# Patient Record
Sex: FEMALE | Race: WHITE | NOT HISPANIC OR LATINO | Employment: FULL TIME | URBAN - METROPOLITAN AREA
[De-identification: names, ages, dates, MRNs, and addresses within clinical notes are randomized per-mention and may not be internally consistent; named-entity substitution may affect disease eponyms.]

---

## 2022-03-08 PROBLEM — F41.1 GAD (GENERALIZED ANXIETY DISORDER): Status: ACTIVE | Noted: 2022-03-08

## 2022-03-08 PROBLEM — F32.9 MAJOR DEPRESSIVE DISORDER: Status: ACTIVE | Noted: 2022-03-08

## 2024-05-02 ENCOUNTER — OFFICE VISIT (OUTPATIENT)
Dept: MEDICAL GROUP | Facility: MEDICAL CENTER | Age: 54
End: 2024-05-02
Payer: COMMERCIAL

## 2024-05-02 VITALS
HEART RATE: 56 BPM | WEIGHT: 181 LBS | SYSTOLIC BLOOD PRESSURE: 126 MMHG | HEIGHT: 70 IN | DIASTOLIC BLOOD PRESSURE: 72 MMHG | OXYGEN SATURATION: 100 % | BODY MASS INDEX: 25.91 KG/M2 | TEMPERATURE: 98.1 F

## 2024-05-02 DIAGNOSIS — Z12.31 ENCOUNTER FOR SCREENING MAMMOGRAM FOR MALIGNANT NEOPLASM OF BREAST: ICD-10-CM

## 2024-05-02 DIAGNOSIS — Z00.00 ENCOUNTER FOR PREVENTATIVE ADULT HEALTH CARE EXAMINATION: Primary | ICD-10-CM

## 2024-05-02 DIAGNOSIS — F41.1 GAD (GENERALIZED ANXIETY DISORDER): ICD-10-CM

## 2024-05-02 DIAGNOSIS — R92.333 HETEROGENEOUSLY DENSE TISSUE OF BOTH BREASTS ON MAMMOGRAPHY: ICD-10-CM

## 2024-05-02 DIAGNOSIS — R63.5 WEIGHT GAIN: ICD-10-CM

## 2024-05-02 PROCEDURE — 99396 PREV VISIT EST AGE 40-64: CPT | Performed by: FAMILY MEDICINE

## 2024-05-02 PROCEDURE — 3074F SYST BP LT 130 MM HG: CPT | Performed by: FAMILY MEDICINE

## 2024-05-02 PROCEDURE — 99214 OFFICE O/P EST MOD 30 MIN: CPT | Mod: 25 | Performed by: FAMILY MEDICINE

## 2024-05-02 PROCEDURE — 3078F DIAST BP <80 MM HG: CPT | Performed by: FAMILY MEDICINE

## 2024-05-02 RX ORDER — ESCITALOPRAM OXALATE 5 MG/1
TABLET ORAL
Qty: 42 TABLET | Refills: 0 | Status: SHIPPED | OUTPATIENT
Start: 2024-05-02 | End: 2024-05-30

## 2024-05-02 ASSESSMENT — FIBROSIS 4 INDEX: FIB4 SCORE: 0.74

## 2024-05-02 NOTE — PROGRESS NOTES
Subjective:     CC:   Chief Complaint   Patient presents with    Annual Exam    Weight Check       HPI:   Nikki Jones is a 53 y.o. female who presents for annual exam    History of Present Illness  The patient is a 53-year-old female who is here for her annual preventative visit and she also has concerns about weight.    The patient typically undergoes breast ultrasound due to a cyst in her left breast. She does not currently have a gynecologist and has no history of pregnancies. Her last Pap smear was conducted in 2023, with no history of abnormal results. Despite experiencing hot flashes, she is not on hormone replacement therapy. She denies any urinary incontinence and maintains a regular diet. She engages in regular exercise and denies any substance abuse. She is currently in a safe relationship and practices safety measures such as wearing a seatbelt in the car, applying sunscreen when outdoors, and brushing her teeth daily.    The patient reports a gradual weight gain of 8 to 10 pounds since the holiday season. She acknowledges excessive consumption of red wine on weekends and occasionally during the week, which she believes has slowed her metabolism. She also consumes sweets, which she attributes to her diet and lack of gym attendance. She speculates that her cortisol levels may be contributing to her weight gain.    The patient is currently under the care of a counselor through [facility], who has recommended medication for her weight. She has previously taken Paxil and has researched Lexapro. She has researched positive reactions to Lexapro online and has expressed concerns about potential side effects.    Cancer screening  Colorectal Cancer Screenin2022 due   Lung Cancer Screening: Non-smoker  Breast Cancer Screenin2023  Hep C screening: Due    Ob-Gyn/ History:    Patient has GYN provider: no  /Para:  G0  Last Pap Smear:  2022.  No history of abnormal pap smears.  Gyn  Surgery: None.  No LMP recorded.  She has not utilized hormone replacement therapy.  Reports hot flashes and night sweats  No significant bloating/fluid retention, pelvic pain, or dyspareunia. No abnormal vaginal discharge.   No breast tenderness, mass, nipple discharge or changes in size or contour.  Urinary incontinence: None    Health Maintenance  Advanced directive: N/A  Osteoporosis Screen/ DEXA: Due at 66 years old  PT/vit D for falls prevention: None   Cholesterol Screenin2024 non-HDL cholesterol 106  Diabetes Screenin2024 glucose 96  Aspirin Use: Not indicated     Diet: Regular   Exercise: Trying to go to the gym, but not often     Substance Abuse: No concerns  Seat belts, bike helmet, gun safety discussed.  Sun protection used.  Routine Dental: Daily brushing and follows with a dentist     Immunizations  Influenza: Out of season   HPV series: Aged out   Tetanus: 2022   Shingles: Due   COVID: Up-to-date  Pneumococcal : Completed 2022  Hep B: Completed series in   Other immunizations: None    OB History   No obstetric history on file.      She  reports that she is not currently sexually active.    She  has no past medical history on file.  She  has a past surgical history that includes other orthopedic surgery ().    Family History   Problem Relation Age of Onset    Heart Disease Mother         MI,  at 62    Lung Disease Mother         + smk    Cancer Father         prostate    No Known Problems Sister     No Known Problems Brother     Stroke Maternal Grandfather         possible     Social History     Tobacco Use    Smoking status: Some Days     Types: Cigars    Smokeless tobacco: Never    Tobacco comments:     only on the weekends   Vaping Use    Vaping Use: Never used   Substance Use Topics    Alcohol use: Yes     Alcohol/week: 6.0 oz     Types: 10 Glasses of wine per week     Comment: 2 glass every night and 2 on the weekend    Drug use: Yes     Types: Marijuana     Comment:  "on the weekends       Patient Active Problem List    Diagnosis Date Noted    Skin tag 05/02/2023    Hot flashes 05/02/2023    Anxiety 05/02/2023    Vitamin D insufficiency 04/14/2022    Encounter for preventative adult health care examination 04/14/2022    Major depressive disorder 03/08/2022    Brain fog 03/08/2022    Tinnitus of both ears 03/08/2022    KARLENE (generalized anxiety disorder) 03/08/2022     Current Outpatient Medications   Medication Sig Dispense Refill    escitalopram (LEXAPRO) 5 MG tablet Take 1 Tablet by mouth every day for 14 days, THEN 2 Tablets every day for 14 days. 42 Tablet 0    Multiple Vitamin (MULTI VITAMIN DAILY PO) Take  by mouth.      B Complex Vitamins (VITAMIN-B COMPLEX PO) Take  by mouth.       No current facility-administered medications for this visit.     No Known Allergies      Objective:   /72   Pulse (!) 56   Temp 36.7 °C (98.1 °F)   Ht 1.778 m (5' 10\")   Wt 82.1 kg (181 lb)   SpO2 100%   BMI 25.97 kg/m²     Wt Readings from Last 4 Encounters:   05/02/24 82.1 kg (181 lb)   05/02/23 70 kg (154 lb 5.2 oz)   04/14/22 75.2 kg (165 lb 12.6 oz)   03/08/22 75.7 kg (166 lb 14.2 oz)       Physical Exam  Constitutional:       General: She is not in acute distress.  HENT:      Head: Normocephalic and atraumatic.      Right Ear: Tympanic membrane and external ear normal.      Left Ear: Tympanic membrane and external ear normal.      Nose: No nasal deformity.      Mouth/Throat:      Lips: Pink.      Mouth: Mucous membranes are moist.      Pharynx: Oropharynx is clear. Uvula midline. No posterior oropharyngeal erythema.   Eyes:      General: Lids are normal.      Extraocular Movements: Extraocular movements intact.      Conjunctiva/sclera: Conjunctivae normal.      Pupils: Pupils are equal, round, and reactive to light.   Neck:      Trachea: Trachea normal.   Cardiovascular:      Rate and Rhythm: Normal rate and regular rhythm.      Heart sounds: Normal heart sounds. No murmur " heard.     No friction rub. No gallop.   Pulmonary:      Effort: Pulmonary effort is normal. No accessory muscle usage.      Breath sounds: Normal breath sounds. No wheezing or rales.   Abdominal:      General: Bowel sounds are normal.      Palpations: Abdomen is soft.      Tenderness: There is no abdominal tenderness.   Musculoskeletal:      Cervical back: Normal range of motion and neck supple.      Right lower leg: No edema.      Left lower leg: No edema.   Lymphadenopathy:      Cervical: No cervical adenopathy.   Skin:     General: Skin is warm and dry.      Findings: No rash.   Neurological:      General: No focal deficit present.      Mental Status: She is alert and oriented to person, place, and time. Mental status is at baseline.      GCS: GCS eye subscore is 4. GCS verbal subscore is 5. GCS motor subscore is 6.      Motor: No weakness.      Gait: Gait is intact.   Psychiatric:         Attention and Perception: Attention normal.         Mood and Affect: Mood and affect normal.         Speech: Speech normal.         Results  Laboratory Studies  Total cholesterol is within range. Triglycerides look good. HDL is 68. LDL is great at 91. Blood sugar is 96. BUN and creatinine, GFR, good kidney function, no sign of kidney disease. Basic electrolytes look good. Calcium levels are good. Liver function and liver enzymes are good. Blood counts are perfect. White blood cells, red blood cells, hemoglobin, hematocrit, no anemia. The shape and color of red blood cells are normal. Platelets look good.      Assessment and Plan:        Assessment & Plan  1. Annual preventative visit.  The patient's last colon cancer screening was conducted in 2023, with a subsequent due in 2027. She is due for a breast cancer screening this year, which revealed the presence of dense breast tissues. Her last Pap smear was conducted in 04/2023, with no history of abnormal results. She continues to experience hot flashes, which are gradually  subsiding. A mammogram and ultrasound have been ordered. The patient has been advised to verify her insurance coverage prior to scheduling the ultrasound. If the insurance does not cover the cost, it is postponed until the coding is fixed. The shingles vaccine has been recommended.    Anticipatory guidance:  --Discussed moderation in sodium/caffeine intake, saturated fat and cholesterol, caloric balance, sufficient fresh fruits/vegetables, fiber, iron, and 0.4-0.8mg of folate supplement per day (for females capable of pregnancy).  --Discussed brushing, flossing, and dental visits.   --Encouraged regular exercise, 150 mins a week of moderate to high intensity cardiovascular activity.   --Discussed tobacco, alcohol, or other drug use; availability of treatment for abuse.   --Discussed sexually transmitted infections, partner selection, use of condoms, avoidance of unintended pregnancy and contraceptive alternatives.  --Injury prevention: Discussed safety belts, safety helmets, smoke detector, etc.  - Discussed vaccinations but they are due for  -Discussed  breast self exam, mammography screening, diet and exercise, colorectal cancer screening     Health maintenance: Due for shingles vaccine, hepatitis C and hep C screen  Immunizations per orders  Patient counseled about skin care, diet, supplements, and exercise.  Labs per orders    2. Weight gain.  Chronic, unstable  A.m. cortisol level test has been ordered, to be conducted 2 to 3 hours post-wakening. Thyroid function tests have been ordered. The patient has been advised to reduce her red wine intake to 200 to 300 calories per glass. Intermittent fasting has been recommended, with a 14-hour fast followed by a 16-hour fast. Cardio workouts and weightlifting have been recommended. Protein intake should be at least 1 g per meal. Plant-based proteins such as legumes, beans, lentils, chickpeas, and peanut butter are also suggested. Red meat should be avoided.    3.  Anxiety.  Chronic, unstable  A prescription for Lexapro 5 mg has been issued for a period of 2 weeks. The patient has been advised to take the medication in the morning. If the patient does not experience any adverse effects after 2 weeks, the dosage may be increased to 10 mg or 2 tablets.    Follow-up  The patient is scheduled for a follow-up visit in 4 weeks.  Nikki was seen today for annual exam and weight check.    Diagnoses and all orders for this visit:    Encounter for preventative adult health care examination    Encounter for screening mammogram for malignant neoplasm of breast  -     MA-SCREENING MAMMO BILAT W/TOMOSYNTHESIS W/CAD; Future  -     US-SCREENING WHOLE BREAST BILATERAL (3D SCREENING); Future    Heterogeneously dense tissue of both breasts on mammography  -     US-SCREENING WHOLE BREAST BILATERAL (3D SCREENING); Future    Weight gain  -     TSH; Future  -     FREE THYROXINE; Future  -     CORTISOL - AM    KARLENE (generalized anxiety disorder)  -     escitalopram (LEXAPRO) 5 MG tablet; Take 1 Tablet by mouth every day for 14 days, THEN 2 Tablets every day for 14 days.         Follow-up: Return in about 4 weeks (around 5/30/2024) for Medication follow-up.       Educated on annual wellness visit information that is covered by their insurance during these exams.  Patient informed that they may receive an additional charge for any acute complaints that are brought up during the annual wellness visit.  Patient acknowledges advisement.    Please note that this dictation was created using voice recognition software. I have made every reasonable attempt to correct obvious errors, but I expect that there are errors of grammar and possibly content that I did not discover before finalizing the note.

## 2024-07-11 ENCOUNTER — HOSPITAL ENCOUNTER (OUTPATIENT)
Dept: RADIOLOGY | Facility: MEDICAL CENTER | Age: 54
End: 2024-07-11
Attending: FAMILY MEDICINE
Payer: COMMERCIAL

## 2024-07-11 DIAGNOSIS — Z12.31 ENCOUNTER FOR SCREENING MAMMOGRAM FOR MALIGNANT NEOPLASM OF BREAST: ICD-10-CM

## 2024-07-11 DIAGNOSIS — R92.333 HETEROGENEOUSLY DENSE TISSUE OF BOTH BREASTS ON MAMMOGRAPHY: ICD-10-CM

## 2024-07-11 PROCEDURE — 77063 BREAST TOMOSYNTHESIS BI: CPT

## 2024-07-11 PROCEDURE — 76641 ULTRASOUND BREAST COMPLETE: CPT

## 2024-07-12 DIAGNOSIS — R92.8 ABNORMAL MAMMOGRAM OF RIGHT BREAST: ICD-10-CM

## 2024-07-12 DIAGNOSIS — N64.89 BREAST ASYMMETRY: ICD-10-CM

## 2024-07-18 ENCOUNTER — HOSPITAL ENCOUNTER (OUTPATIENT)
Dept: RADIOLOGY | Facility: MEDICAL CENTER | Age: 54
End: 2024-07-18
Attending: FAMILY MEDICINE
Payer: COMMERCIAL

## 2024-07-18 DIAGNOSIS — N64.89 BREAST ASYMMETRY: ICD-10-CM

## 2024-07-18 DIAGNOSIS — R92.8 ABNORMAL MAMMOGRAM OF RIGHT BREAST: ICD-10-CM

## 2024-07-18 PROCEDURE — G0279 TOMOSYNTHESIS, MAMMO: HCPCS

## 2024-07-18 PROCEDURE — 76642 ULTRASOUND BREAST LIMITED: CPT | Mod: RT

## 2024-08-22 LAB
T4 FREE SERPL-MCNC: 1.24 NG/DL (ref 0.82–1.77)
TSH SERPL DL<=0.005 MIU/L-ACNC: 1.34 UIU/ML (ref 0.45–4.5)

## 2025-04-14 SDOH — HEALTH STABILITY: PHYSICAL HEALTH: ON AVERAGE, HOW MANY MINUTES DO YOU ENGAGE IN EXERCISE AT THIS LEVEL?: 0 MIN

## 2025-04-14 SDOH — ECONOMIC STABILITY: INCOME INSECURITY: HOW HARD IS IT FOR YOU TO PAY FOR THE VERY BASICS LIKE FOOD, HOUSING, MEDICAL CARE, AND HEATING?: NOT VERY HARD

## 2025-04-14 SDOH — HEALTH STABILITY: PHYSICAL HEALTH: ON AVERAGE, HOW MANY DAYS PER WEEK DO YOU ENGAGE IN MODERATE TO STRENUOUS EXERCISE (LIKE A BRISK WALK)?: 0 DAYS

## 2025-04-14 SDOH — ECONOMIC STABILITY: FOOD INSECURITY: WITHIN THE PAST 12 MONTHS, YOU WORRIED THAT YOUR FOOD WOULD RUN OUT BEFORE YOU GOT MONEY TO BUY MORE.: NEVER TRUE

## 2025-04-14 SDOH — ECONOMIC STABILITY: FOOD INSECURITY: WITHIN THE PAST 12 MONTHS, THE FOOD YOU BOUGHT JUST DIDN'T LAST AND YOU DIDN'T HAVE MONEY TO GET MORE.: NEVER TRUE

## 2025-04-14 SDOH — HEALTH STABILITY: MENTAL HEALTH
STRESS IS WHEN SOMEONE FEELS TENSE, NERVOUS, ANXIOUS, OR CAN'T SLEEP AT NIGHT BECAUSE THEIR MIND IS TROUBLED. HOW STRESSED ARE YOU?: VERY MUCH

## 2025-04-14 SDOH — ECONOMIC STABILITY: INCOME INSECURITY: IN THE LAST 12 MONTHS, WAS THERE A TIME WHEN YOU WERE NOT ABLE TO PAY THE MORTGAGE OR RENT ON TIME?: NO

## 2025-04-14 SDOH — ECONOMIC STABILITY: TRANSPORTATION INSECURITY
IN THE PAST 12 MONTHS, HAS LACK OF RELIABLE TRANSPORTATION KEPT YOU FROM MEDICAL APPOINTMENTS, MEETINGS, WORK OR FROM GETTING THINGS NEEDED FOR DAILY LIVING?: NO

## 2025-04-14 SDOH — ECONOMIC STABILITY: TRANSPORTATION INSECURITY
IN THE PAST 12 MONTHS, HAS THE LACK OF TRANSPORTATION KEPT YOU FROM MEDICAL APPOINTMENTS OR FROM GETTING MEDICATIONS?: NO

## 2025-04-14 SDOH — ECONOMIC STABILITY: TRANSPORTATION INSECURITY
IN THE PAST 12 MONTHS, HAS LACK OF TRANSPORTATION KEPT YOU FROM MEETINGS, WORK, OR FROM GETTING THINGS NEEDED FOR DAILY LIVING?: NO

## 2025-04-14 SDOH — ECONOMIC STABILITY: HOUSING INSECURITY
IN THE LAST 12 MONTHS, WAS THERE A TIME WHEN YOU DID NOT HAVE A STEADY PLACE TO SLEEP OR SLEPT IN A SHELTER (INCLUDING NOW)?: NO

## 2025-04-14 ASSESSMENT — SOCIAL DETERMINANTS OF HEALTH (SDOH)
HOW OFTEN DO YOU GET TOGETHER WITH FRIENDS OR RELATIVES?: ONCE A WEEK
DO YOU BELONG TO ANY CLUBS OR ORGANIZATIONS SUCH AS CHURCH GROUPS UNIONS, FRATERNAL OR ATHLETIC GROUPS, OR SCHOOL GROUPS?: NO
DO YOU BELONG TO ANY CLUBS OR ORGANIZATIONS SUCH AS CHURCH GROUPS UNIONS, FRATERNAL OR ATHLETIC GROUPS, OR SCHOOL GROUPS?: NO
HOW OFTEN DO YOU ATTENT MEETINGS OF THE CLUB OR ORGANIZATION YOU BELONG TO?: NEVER
HOW MANY DRINKS CONTAINING ALCOHOL DO YOU HAVE ON A TYPICAL DAY WHEN YOU ARE DRINKING: 1 OR 2
HOW OFTEN DO YOU ATTENT MEETINGS OF THE CLUB OR ORGANIZATION YOU BELONG TO?: NEVER
WITHIN THE PAST 12 MONTHS, YOU WORRIED THAT YOUR FOOD WOULD RUN OUT BEFORE YOU GOT THE MONEY TO BUY MORE: NEVER TRUE
HOW OFTEN DO YOU ATTEND CHURCH OR RELIGIOUS SERVICES?: NEVER
HOW OFTEN DO YOU GET TOGETHER WITH FRIENDS OR RELATIVES?: ONCE A WEEK
HOW OFTEN DO YOU HAVE SIX OR MORE DRINKS ON ONE OCCASION: NEVER
IN A TYPICAL WEEK, HOW MANY TIMES DO YOU TALK ON THE PHONE WITH FAMILY, FRIENDS, OR NEIGHBORS?: ONCE A WEEK
HOW OFTEN DO YOU HAVE A DRINK CONTAINING ALCOHOL: 2-3 TIMES A WEEK
HOW HARD IS IT FOR YOU TO PAY FOR THE VERY BASICS LIKE FOOD, HOUSING, MEDICAL CARE, AND HEATING?: NOT VERY HARD
IN THE PAST 12 MONTHS, HAS THE ELECTRIC, GAS, OIL, OR WATER COMPANY THREATENED TO SHUT OFF SERVICE IN YOUR HOME?: NO
IN A TYPICAL WEEK, HOW MANY TIMES DO YOU TALK ON THE PHONE WITH FAMILY, FRIENDS, OR NEIGHBORS?: ONCE A WEEK
HOW OFTEN DO YOU ATTEND CHURCH OR RELIGIOUS SERVICES?: NEVER

## 2025-04-14 ASSESSMENT — LIFESTYLE VARIABLES
SKIP TO QUESTIONS 9-10: 1
HOW OFTEN DO YOU HAVE A DRINK CONTAINING ALCOHOL: 2-3 TIMES A WEEK
AUDIT-C TOTAL SCORE: 3
HOW OFTEN DO YOU HAVE SIX OR MORE DRINKS ON ONE OCCASION: NEVER
HOW MANY STANDARD DRINKS CONTAINING ALCOHOL DO YOU HAVE ON A TYPICAL DAY: 1 OR 2

## 2025-04-15 ENCOUNTER — APPOINTMENT (OUTPATIENT)
Dept: MEDICAL GROUP | Facility: MEDICAL CENTER | Age: 55
End: 2025-04-15
Payer: COMMERCIAL

## 2025-04-15 ENCOUNTER — HOSPITAL ENCOUNTER (OUTPATIENT)
Facility: MEDICAL CENTER | Age: 55
End: 2025-04-15
Attending: FAMILY MEDICINE
Payer: COMMERCIAL

## 2025-04-15 VITALS
DIASTOLIC BLOOD PRESSURE: 72 MMHG | SYSTOLIC BLOOD PRESSURE: 122 MMHG | WEIGHT: 194 LBS | BODY MASS INDEX: 28.73 KG/M2 | HEART RATE: 87 BPM | OXYGEN SATURATION: 98 % | HEIGHT: 69 IN | TEMPERATURE: 98.3 F

## 2025-04-15 DIAGNOSIS — Z13.220 ENCOUNTER FOR LIPID SCREENING FOR CARDIOVASCULAR DISEASE: ICD-10-CM

## 2025-04-15 DIAGNOSIS — E55.9 VITAMIN D INSUFFICIENCY: ICD-10-CM

## 2025-04-15 DIAGNOSIS — Z01.419 ENCOUNTER FOR WELL WOMAN EXAM WITH ROUTINE GYNECOLOGICAL EXAM: Primary | ICD-10-CM

## 2025-04-15 DIAGNOSIS — Z01.419 ENCOUNTER FOR WELL WOMAN EXAM WITH ROUTINE GYNECOLOGICAL EXAM: ICD-10-CM

## 2025-04-15 DIAGNOSIS — R92.333 HETEROGENEOUSLY DENSE TISSUE OF BOTH BREASTS ON MAMMOGRAPHY: ICD-10-CM

## 2025-04-15 DIAGNOSIS — Z11.4 ENCOUNTER FOR SCREENING FOR HIV: ICD-10-CM

## 2025-04-15 DIAGNOSIS — Z12.31 ENCOUNTER FOR SCREENING MAMMOGRAM FOR MALIGNANT NEOPLASM OF BREAST: ICD-10-CM

## 2025-04-15 DIAGNOSIS — Z13.1 SCREENING FOR DIABETES MELLITUS: ICD-10-CM

## 2025-04-15 DIAGNOSIS — Z13.6 ENCOUNTER FOR LIPID SCREENING FOR CARDIOVASCULAR DISEASE: ICD-10-CM

## 2025-04-15 DIAGNOSIS — Z13.29 THYROID DISORDER SCREENING: ICD-10-CM

## 2025-04-15 DIAGNOSIS — Z11.59 ENCOUNTER FOR HEPATITIS C SCREENING TEST FOR LOW RISK PATIENT: ICD-10-CM

## 2025-04-15 DIAGNOSIS — Z13.0 SCREENING FOR DEFICIENCY ANEMIA: ICD-10-CM

## 2025-04-15 PROCEDURE — 99213 OFFICE O/P EST LOW 20 MIN: CPT | Mod: 25 | Performed by: FAMILY MEDICINE

## 2025-04-15 PROCEDURE — 99396 PREV VISIT EST AGE 40-64: CPT | Performed by: FAMILY MEDICINE

## 2025-04-15 PROCEDURE — 3074F SYST BP LT 130 MM HG: CPT | Performed by: FAMILY MEDICINE

## 2025-04-15 PROCEDURE — 87624 HPV HI-RISK TYP POOLED RSLT: CPT

## 2025-04-15 PROCEDURE — 3078F DIAST BP <80 MM HG: CPT | Performed by: FAMILY MEDICINE

## 2025-04-15 PROCEDURE — 88142 CYTOPATH C/V THIN LAYER: CPT

## 2025-04-15 ASSESSMENT — FIBROSIS 4 INDEX: FIB4 SCORE: 0.75

## 2025-04-15 NOTE — PROGRESS NOTES
Verbal consent was acquired by the patient to use [employer] ambient listening note generation during this visit Yes    Subjective:     CC:   Chief Complaint   Patient presents with    Annual Exam    Weight Loss    Gynecologic Exam    Ear Laceration     Bump in left ear        HPI:   Nikki Rahman is a 54 y.o. female who presents for annual exam    History of Present Illness  The patient is a 54-year-old individual presenting for their annual exam.    Gynecological History  - No history of pregnancies or gynecological surgeries  - Cervical dysplasia diagnosed at age 16, treated with cryotherapy, no subsequent abnormalities  - No hormone replacement therapy  - Reports hot flashes and night sweats, attributed to red wine consumption  - Denies urinary incontinence, pelvic pain, bloating, or abnormal vaginal discharge    General Health  - Maintains oral hygiene  - No recent illnesses or allergies  - Reports a painful breast cyst  - Takes a daily multivitamin, no dietary restrictions, eats out weekly  - Admits lack of physical exercise  - No substance abuse, currently not in a relationship  - Consistent seatbelt use, irregular sunscreen application    Weight Management  - Weight gain attributed to sedentary lifestyle, alcohol consumption, and menopause  - Acknowledges need to reduce alcohol intake and increase physical activity    Supplemental information: Cryotherapy for cervical dysplasia at age 16    SOCIAL HISTORY  Occasional weekend cigar smoking. Drinks alcohol.    Cancer screening  Colorectal Cancer Screening: Due 2027  Lung Cancer Screening: Cigars  Breast Cancer Screening: Due 2025  Hep C screening: Due    Ob-Gyn/ History:    Patient has GYN provider: no  /Para: G0  Last Pap Smear: Due. Positive history of abnormal pap smears, but normal since  Gyn Surgery:  None.  Menopausal  She has not utilized hormone replacement therapy.  Reports hot flashes, night sweats, sleep disruption, and mood  changes  No significant bloating/fluid retention, pelvic pain, or dyspareunia. No abnormal vaginal discharge.   No breast tenderness, mass, nipple discharge or changes in size or contour.  Urinary incontinence: None    Health Maintenance  Advanced directive: Due at 66 years old  Osteoporosis Screen/ DEXA: Due at 61 years old  PT/vit D for falls prevention: Daily multi   Cholesterol Screenin2024 non-HDL cholesterol 106   Diabetes Screenin2024 Glucose 96   Aspirin Use: Not indicated The 10-year ASCVD risk score (Ryan VALADEZ, et al., 2019) is: 2.6%      Diet: Regular, eating out once a week   Exercise: None     Substance Abuse: No concerns  Seat belts, bike helmet, gun safety discussed.  Sun protection not used often  Routine Dental: Daily brushing and follows with a dentist     Immunizations  Influenza: Due 2025   HPV series: Aged out   Tetanus: Due 2032   Shingles: Due   COVID: Due for booster  Pneumococcal : Up-to-date  Hep B: Completed series  Other immunizations: None    OB History   No obstetric history on file.      She  reports that she is not currently sexually active and has had partner(s) who are male. She reports using the following method of birth control/protection: Post-Menopausal.    She  has a past medical history of Allergy, Anxiety, Asthma, and Depression.  She  has a past surgical history that includes other orthopedic surgery () and open reduction.    Family History   Problem Relation Age of Onset    Heart Disease Mother          from a heart attack at 62    Lung Disease Mother         + smk    Cancer Father         prostate    No Known Problems Sister     No Known Problems Brother     Stroke Maternal Grandfather         possible    Breast Cancer Paternal Grandmother      Social History     Tobacco Use    Smoking status: Light Smoker     Types: Cigars    Smokeless tobacco: Never    Tobacco comments:     like to smoke a cigar or two on weekends   Vaping Use    Vaping status:  "Never Used   Substance Use Topics    Alcohol use: Yes     Alcohol/week: 6.0 oz     Types: 10 Glasses of wine per week     Comment: 2 glass every night and 2 on the weekend    Drug use: Not Currently     Types: Marijuana     Comment: on the weekends       Patient Active Problem List    Diagnosis Date Noted    Skin tag 05/02/2023    Hot flashes 05/02/2023    Anxiety 05/02/2023    Vitamin D insufficiency 04/14/2022    Encounter for well woman exam with routine gynecological exam 04/14/2022    Major depressive disorder 03/08/2022    Brain fog 03/08/2022    Tinnitus of both ears 03/08/2022    KARLENE (generalized anxiety disorder) 03/08/2022     Current Outpatient Medications   Medication Sig Dispense Refill    Multiple Vitamin (MULTI VITAMIN DAILY PO) Take  by mouth.      B Complex Vitamins (VITAMIN-B COMPLEX PO) Take  by mouth.       No current facility-administered medications for this visit.     No Known Allergies      Objective:   /72   Pulse 87   Temp 36.8 °C (98.3 °F)   Ht 1.76 m (5' 9.29\")   Wt 88 kg (194 lb)   SpO2 98%   BMI 28.41 kg/m²     Wt Readings from Last 4 Encounters:   04/15/25 88 kg (194 lb)   05/02/24 82.1 kg (181 lb)   05/02/23 70 kg (154 lb 5.2 oz)   04/14/22 75.2 kg (165 lb 12.6 oz)           Physical Exam  Exam conducted with a chaperone present.   Constitutional:       General: She is not in acute distress.  HENT:      Head: Normocephalic and atraumatic.      Right Ear: Tympanic membrane and external ear normal.      Left Ear: Tympanic membrane and external ear normal.      Nose: No nasal deformity.      Mouth/Throat:      Lips: Pink.      Mouth: Mucous membranes are moist.      Pharynx: Oropharynx is clear. Uvula midline. No posterior oropharyngeal erythema.   Eyes:      General: Lids are normal.      Extraocular Movements: Extraocular movements intact.      Conjunctiva/sclera: Conjunctivae normal.      Pupils: Pupils are equal, round, and reactive to light.   Neck:      Trachea: Trachea " normal.   Cardiovascular:      Rate and Rhythm: Normal rate and regular rhythm.      Heart sounds: Normal heart sounds. No murmur heard.     No friction rub. No gallop.   Pulmonary:      Effort: Pulmonary effort is normal. No accessory muscle usage.      Breath sounds: Normal breath sounds. No wheezing or rales.   Abdominal:      General: Bowel sounds are normal.      Palpations: Abdomen is soft.      Tenderness: There is no abdominal tenderness.   Genitourinary:     Labia:         Right: No rash, tenderness or lesion.         Left: No rash, tenderness or lesion.       Vagina: Normal.      Cervix: Normal. No friability or erythema.      Uterus: Normal.       Adnexa: Right adnexa normal and left adnexa normal.   Musculoskeletal:      Cervical back: Normal range of motion and neck supple.      Right lower leg: No edema.      Left lower leg: No edema.   Lymphadenopathy:      Cervical: No cervical adenopathy.   Skin:     General: Skin is warm and dry.      Findings: No rash.   Neurological:      General: No focal deficit present.      Mental Status: She is alert and oriented to person, place, and time. Mental status is at baseline.      GCS: GCS eye subscore is 4. GCS verbal subscore is 5. GCS motor subscore is 6.      Motor: No weakness.      Gait: Gait is intact.   Psychiatric:         Attention and Perception: Attention normal.         Mood and Affect: Mood and affect normal.         Speech: Speech normal.         Assessment and Plan:        Assessment & Plan  1. Annual health examination.  Anticipatory guidance:  --Discussed moderation in sodium/caffeine intake, saturated fat and cholesterol, caloric balance, sufficient fresh fruits/vegetables, fiber, iron, and 0.4-0.8mg of folate supplement per day (for females capable of pregnancy).  --Discussed brushing, flossing, and dental visits.   --Encouraged regular exercise, 150 mins a week of moderate to high intensity cardiovascular activity.   --Discussed tobacco,  alcohol, or other drug use; availability of treatment for abuse.   --Discussed sexually transmitted infections, partner selection, use of condoms, avoidance of unintended pregnancy and contraceptive alternatives.  --Injury prevention: Discussed safety belts, safety helmets, smoke detector, etc.  - Discussed vaccinations but they are due for  -Discussed  breast self exam, mammography screening, diet and exercise, colorectal cancer screening     Health maintenance: Due for HIV screening, hep C screening, Pap smear, COVID and shingles  Patient counseled about skin care, diet, supplements, and exercise.  Labs per orders    2. Obesity: Chronic and stable.  - Recommended dietary modifications and increased physical exercise.  - Informed menopause can complicate weight loss.  - Advised starting with achievable goals, such as a 20-minute daily walk, and gradually increasing physical activity. Discussed calorie restriction, reducing alcohol intake, and avoiding high-calorie drinks.    Follow-up  - Annual health exam in 1 year.  Nikki was seen today for annual exam, weight loss, gynecologic exam and ear laceration.    Diagnoses and all orders for this visit:    Encounter for well woman exam with routine gynecological exam  -     THINPREP PAP WITH HPV; Future    Encounter for screening mammogram for malignant neoplasm of breast  -     MA-SCREENING MAMMO BILAT W/TOMOSYNTHESIS W/CAD; Future    Vitamin D insufficiency  -     VITAMIN D,25 HYDROXY (DEFICIENCY); Future    Screening for deficiency anemia  -     CBC WITH DIFFERENTIAL; Future    Screening for diabetes mellitus  -     CBC WITH DIFFERENTIAL; Future  -     Comp Metabolic Panel; Future  -     ESTIMATED GFR; Future  -     HEMOGLOBIN A1C; Future    Encounter for lipid screening for cardiovascular disease  -     Lipid Profile; Future    Thyroid disorder screening  -     TSH+FREE T4    Encounter for screening for HIV  -     HIV AG/AB COMBO ASSAY SCREENING; Future    Encounter  for hepatitis C screening test for low risk patient  -     HEP C VIRUS ANTIBODY; Future    Heterogeneously dense tissue of both breasts on mammography  -     US-SCREENING WHOLE BREAST BILATERAL (3D SCREENING); Future         Follow-up: Return in about 1 year (around 4/15/2026) for Annual.     Educated on annual wellness visit information that is covered by their insurance during these exams.  Patient informed that they may receive an additional charge for any acute complaints that are brought up during the annual wellness visit.  Patient acknowledges advisement.    Please note that this dictation was created using voice recognition software. I have made every reasonable attempt to correct obvious errors, but I expect that there are errors of grammar and possibly content that I did not discover before finalizing the note.

## 2025-04-23 LAB
HPV I/H RISK 1 DNA SPEC QL NAA+PROBE: NOT DETECTED
SPECIMEN SOURCE: NORMAL
THINPREP PAP, CYTOLOGY NL11781: NORMAL

## 2025-04-24 ENCOUNTER — RESULTS FOLLOW-UP (OUTPATIENT)
Dept: MEDICAL GROUP | Facility: MEDICAL CENTER | Age: 55
End: 2025-04-24
Payer: COMMERCIAL

## 2025-07-15 ENCOUNTER — OFFICE VISIT (OUTPATIENT)
Dept: MEDICAL GROUP | Facility: MEDICAL CENTER | Age: 55
End: 2025-07-15
Payer: COMMERCIAL

## 2025-07-15 VITALS
SYSTOLIC BLOOD PRESSURE: 120 MMHG | DIASTOLIC BLOOD PRESSURE: 74 MMHG | BODY MASS INDEX: 29.62 KG/M2 | HEART RATE: 59 BPM | OXYGEN SATURATION: 96 % | WEIGHT: 200 LBS | TEMPERATURE: 97.1 F | HEIGHT: 69 IN

## 2025-07-15 DIAGNOSIS — S99.921A INJURY OF RIGHT GREAT TOE, INITIAL ENCOUNTER: Primary | ICD-10-CM

## 2025-07-15 PROCEDURE — 3078F DIAST BP <80 MM HG: CPT | Performed by: FAMILY MEDICINE

## 2025-07-15 PROCEDURE — 99213 OFFICE O/P EST LOW 20 MIN: CPT | Performed by: FAMILY MEDICINE

## 2025-07-15 PROCEDURE — 3074F SYST BP LT 130 MM HG: CPT | Performed by: FAMILY MEDICINE

## 2025-07-15 ASSESSMENT — FIBROSIS 4 INDEX: FIB4 SCORE: 0.75

## 2025-07-25 ENCOUNTER — HOSPITAL ENCOUNTER (OUTPATIENT)
Dept: RADIOLOGY | Facility: MEDICAL CENTER | Age: 55
End: 2025-07-25
Attending: FAMILY MEDICINE
Payer: COMMERCIAL

## 2025-07-25 DIAGNOSIS — R92.333 HETEROGENEOUSLY DENSE TISSUE OF BOTH BREASTS ON MAMMOGRAPHY: ICD-10-CM

## 2025-07-25 DIAGNOSIS — Z12.31 ENCOUNTER FOR SCREENING MAMMOGRAM FOR MALIGNANT NEOPLASM OF BREAST: ICD-10-CM

## 2025-07-25 PROCEDURE — 77063 BREAST TOMOSYNTHESIS BI: CPT

## 2025-07-25 PROCEDURE — 76641 ULTRASOUND BREAST COMPLETE: CPT

## 2025-07-28 ENCOUNTER — RESULTS FOLLOW-UP (OUTPATIENT)
Dept: MEDICAL GROUP | Facility: MEDICAL CENTER | Age: 55
End: 2025-07-28
Payer: COMMERCIAL